# Patient Record
Sex: FEMALE | Race: BLACK OR AFRICAN AMERICAN
[De-identification: names, ages, dates, MRNs, and addresses within clinical notes are randomized per-mention and may not be internally consistent; named-entity substitution may affect disease eponyms.]

---

## 2017-05-25 ENCOUNTER — HOSPITAL ENCOUNTER (EMERGENCY)
Dept: HOSPITAL 74 - JERFT | Age: 77
Discharge: HOME | End: 2017-05-25
Payer: COMMERCIAL

## 2017-05-25 VITALS — DIASTOLIC BLOOD PRESSURE: 80 MMHG | TEMPERATURE: 98.2 F | HEART RATE: 87 BPM | SYSTOLIC BLOOD PRESSURE: 145 MMHG

## 2017-05-25 VITALS — BODY MASS INDEX: 32.9 KG/M2

## 2017-05-25 DIAGNOSIS — L02.511: Primary | ICD-10-CM

## 2017-05-25 DIAGNOSIS — L03.011: ICD-10-CM

## 2017-05-25 DIAGNOSIS — Z79.4: ICD-10-CM

## 2017-05-25 DIAGNOSIS — E11.9: ICD-10-CM

## 2017-05-25 DIAGNOSIS — E78.00: ICD-10-CM

## 2017-05-25 DIAGNOSIS — I10: ICD-10-CM

## 2017-05-25 PROCEDURE — 0H9FXZZ DRAINAGE OF RIGHT HAND SKIN, EXTERNAL APPROACH: ICD-10-PCS

## 2017-05-25 NOTE — PDOC
History of Present Illness





- General


Chief Complaint: Wound Infection


Stated Complaint: Wound


Time Seen by Provider: 05/25/17 14:40


History Source: Patient





- History of Present Illness


Initial Comments: 


05/25/17 14:49


Chief complaint: My thumb hurts





Pt. is a 76 y/o female with a PMH of IDDM who presents with a chief complaint 

of R first finger pain. Pt. states that she thinks "it is infected". Pt states 

that she picked a cuticle approximately one week ago. She states that since then

, the area around the nail has become swollen. She states that she has been 

using Tylenol and antibiotic ointment on the area with some relief. Denies 

fevers, chills, fatigue, numbness, tingling, or weakness in the area.











Past History





- Travel


Traveled outside of the country in the last 30 days: No


Close contact w/someone who was outside of country & ill: No





- Past Medical History


Allergies/Adverse Reactions: 


 Allergies











Allergy/AdvReac Type Severity Reaction Status Date / Time


 


Penicillins Allergy  rash Verified 05/25/17 14:12





   swelling  











Home Medications: 


Ambulatory Orders





Atorvastatin Ca [Lipitor] 10 mg PO DAILY 05/05/14 


Insulin Glargine,Hum.rec.anlog [Lantus (10mL VIAL) -] 100 units PO DAILY 05/05/ 14 


Mag Ox/Calcium/Potassium/E/Adam [Leg-Gesic Multi Minerals Liq] 473 ml PO DAILY 05 /05/14 


Insulin Aspart Prot/Insuln Asp [Novolog Mix 70-30 Vial] 100 unit SQ ASDIR 05/25/ 17 


Lovastatin 20 mg PO ASDIR 05/25/17 


Sodium Bicarbonate 325 mg PO ASDIR 05/25/17 


Sulfamethoxazole/Trimethoprim [Bactrim Ds -] 1 tab PO BID #14 tablet 05/25/17 


Warfarin Na [Coumadin] 5 mg PO ASDIR 05/25/17 








Diabetes: Yes


HTN: Yes


Hypercholesterolemia: Yes





- Psycho/Social/Smoking Cessation Hx


Anxiety: No


Suicidal Ideation: No


Smoking Status: No


Smoking History: Never smoked


Number of Cigarettes Smoked Daily: 0


Information on smoking cessation initiated: No


Hx Alcohol Use: No


Substance Use Type: None





**Review of Systems





- Review of Systems


Able to Perform ROS?: Yes


Is the patient limited English proficient: No


Constitutional: Yes: Malaise.  No: Chills, Fever, Weakness


Integumentary: Yes: Change in Color, Other (Pain of the r 1st finger, abscess)





*Physical Exam





- Vital Signs


 Last Vital Signs











Temp Pulse Resp BP Pulse Ox


 


 98.2 F   87   18   145/80   98 


 


 05/25/17 14:08  05/25/17 14:08  05/25/17 14:08  05/25/17 14:08  05/25/17 14:08














- Physical Exam


General Appearance: Yes: Nourished, Appropriately Dressed.  No: Apparent 

Distress


Extremity: positive: Normal Capillary Refill, Normal Range of Motion, Erythema (

R distal 1st finger), Inflammation (R distal first finer)


Integumentary: positive: Dry, Warm, Erythema (R distal first finger), Other (

Paronychia of the R lateral cuticle of the R 1st finger)





Procedures





- Incision and Drainage


I&D Site: Right: Paronychia (right medial cuticle)


Betadine cleansed: Yes (soaked with saline and betadine solution for 10 minutes)


Blade Size: 18g needle used


Attempts: 1 (abscess drained with good output. Milked area and all pus was 

evacuated)





Medical Decision Making





- Medical Decision Making


05/25/17 15:15


Pt. is a 76 y/o female PMH of IDDM, presenting with a paronychia of the R 1st 

finger. Will drain the abscess at this time. Pt. will soak the finger with a 

betadine/saline solution prior to drainage. Will place pt. on antibiotics post 

procedure d/t IDDM. Obtain wound culture.





05/25/17 16:08


Pt. tolerated procedure well. Soak brought abscess to the top of the skin and 

was easily drained with an 18 gauge needle. All pus appears to be milked out of 

the abscess. Wound culture sent. Bandaid placed over the wound. Pt. instructed 

to keep with warm water soaks at home 2-4 times per day for at least 15 

minutes. Will place pt. on bactrim d/t IDDM history. Will have pt. follow up 

with her PCP. Pt. understands all discharge instructions and all questions were 

answered at this time. 





*DC/Admit/Observation/Transfer


Diagnosis at time of Disposition: 


Paronychia


Qualifiers:


 Laterality: right Qualified Code(s): L03.011 - Cellulitis of right finger





- Discharge Dispostion


Disposition: HOME


Condition at time of disposition: Stable


Admit: No





- Prescriptions


Prescriptions: 


Sulfamethoxazole/Trimethoprim [Bactrim Ds -] 1 tab PO BID #14 tablet





- Referrals


Referrals: 


Randi Britt [Primary Care Provider] - 





- Patient Instructions


Printed Discharge Instructions:  [facility] for Paronychia


Additional Instructions: 


You have an infection near your nail bed called a paronychia. It was drained 

today in the emergency department. Continue with your warm water soaks (15 

minutes 2-4 times a day). You were prescribed antibiotics. Take the antibiotics 

as prescribed and finish the dose even if you feel better. You may take Tylenol 

or motrin as needed for pain.





Return the the ED if the swelling or infection gets worse, if you develop 

fevers or chills, or if you have any changes in your symptoms.

## 2017-09-12 ENCOUNTER — HOSPITAL ENCOUNTER (EMERGENCY)
Dept: HOSPITAL 74 - JER | Age: 77
LOS: 1 days | Discharge: HOME | End: 2017-09-13
Payer: COMMERCIAL

## 2017-09-12 VITALS — BODY MASS INDEX: 33 KG/M2

## 2017-09-12 DIAGNOSIS — R23.8: ICD-10-CM

## 2017-09-12 DIAGNOSIS — Z79.4: ICD-10-CM

## 2017-09-12 DIAGNOSIS — L02.415: Primary | ICD-10-CM

## 2017-09-12 DIAGNOSIS — E78.00: ICD-10-CM

## 2017-09-12 DIAGNOSIS — I10: ICD-10-CM

## 2017-09-12 DIAGNOSIS — E11.9: ICD-10-CM

## 2017-09-12 PROCEDURE — 0J9L0ZZ DRAINAGE OF RIGHT UPPER LEG SUBCUTANEOUS TISSUE AND FASCIA, OPEN APPROACH: ICD-10-PCS

## 2017-09-13 VITALS — TEMPERATURE: 98.4 F

## 2017-09-13 VITALS — HEART RATE: 96 BPM | SYSTOLIC BLOOD PRESSURE: 154 MMHG | DIASTOLIC BLOOD PRESSURE: 95 MMHG

## 2017-09-13 LAB
ALBUMIN SERPL-MCNC: 3.7 G/DL (ref 3.4–5)
ALP SERPL-CCNC: 117 U/L (ref 45–117)
ALT SERPL-CCNC: 22 U/L (ref 12–78)
ANION GAP SERPL CALC-SCNC: 7 MMOL/L (ref 8–16)
AST SERPL-CCNC: 14 U/L (ref 15–37)
BASOPHILS # BLD: 0.8 % (ref 0–2)
BILIRUB SERPL-MCNC: 0.8 MG/DL (ref 0.2–1)
CALCIUM SERPL-MCNC: 8.9 MG/DL (ref 8.5–10.1)
CO2 SERPL-SCNC: 24 MMOL/L (ref 21–32)
CREAT SERPL-MCNC: 1.6 MG/DL (ref 0.55–1.02)
DEPRECATED RDW RBC AUTO: 14 % (ref 11.6–15.6)
EOSINOPHIL # BLD: 1.3 % (ref 0–4.5)
GLUCOSE SERPL-MCNC: 246 MG/DL (ref 74–106)
INR BLD: 1.2 (ref 0.82–1.09)
MCH RBC QN AUTO: 28.8 PG (ref 25.7–33.7)
MCHC RBC AUTO-ENTMCNC: 32.8 G/DL (ref 32–36)
MCV RBC: 87.7 FL (ref 80–96)
NEUTROPHILS # BLD: 64.7 % (ref 42.8–82.8)
PLATELET # BLD AUTO: 203 K/MM3 (ref 134–434)
PMV BLD: 8.4 FL (ref 7.5–11.1)
PROT SERPL-MCNC: 6.8 G/DL (ref 6.4–8.2)
PT PNL PPP: 13.2 SEC (ref 9.98–11.88)
WBC # BLD AUTO: 6.2 K/MM3 (ref 4–10)

## 2017-09-13 NOTE — PDOC
*Physical Exam





- Vital Signs


 Last Vital Signs











Temp Pulse Resp BP Pulse Ox


 


 98.4 F   96 H  14   154/95   97 


 


 09/13/17 00:19  09/13/17 00:17  09/13/17 00:17  09/13/17 00:17  09/13/17 00:17














ED Treatment Course





- LABORATORY


CBC & Chemistry Diagram: 


 09/13/17 01:29





 09/13/17 01:29





Medical Decision Making





- Medical Decision Making





09/13/17 00:47


agree with care from [facility]





*DC/Admit/Observation/Transfer


Diagnosis at time of Disposition: 


 Skin bulla, Cellulitis and abscess of right leg





- Discharge Dispostion


Disposition: HOME





- Prescriptions


Prescriptions: 


Clindamycin [Cleocin -] 300 mg PO TID #30 capsule





- Referrals


Referrals: 


Randi Britt [Primary Care Provider] - 





- Patient Instructions


Printed Discharge Instructions:  [facility] for Wound Infection


Additional Instructions: 


take clindamycin as prescribed. 





follow up with your in 1 day for a wound check;








return to the ED  if you develop fever, worsening redness, pain and streaking 

up the leg.

## 2017-09-13 NOTE — PDOC
History of Present Illness





- General


Chief Complaint: Wound Infection


Stated Complaint: ABSCESS BOIL


Time Seen by Provider: 09/13/17 00:25


History Source: Patient





- History of Present Illness


Initial Comments: 


09/13/17 01:18


77 year old c/o of blister/boil to right thigh with increased pain and redness 

to the area since this morning. patient reports initially small pustule noted. 

now appears to be a bullae with pain. patient has a history of diabetes, DVTs 

on coumadin, hypercholestremia denies fever, NVD, abdominal pain











Past History





- Past Medical History


Allergies/Adverse Reactions: 


 Allergies











Allergy/AdvReac Type Severity Reaction Status Date / Time


 


Penicillins Allergy  rash Verified 09/13/17 00:16





   swelling  











Home Medications: 


Ambulatory Orders





Atorvastatin Ca [Lipitor] 10 mg PO DAILY 05/05/14 


Mag Ox/Calcium/Potassium/E/Adam [Leg-Gesic Multi Minerals Liq] 473 ml PO DAILY 05 /05/14 


Insulin Aspart Prot/Insuln Asp [Novolog Mix 70-30 Vial] 22 unit SQ AM 05/25/17 


Lovastatin 20 mg PO ASDIR 05/25/17 


Sodium Bicarbonate 2 tab PO DAILY 05/25/17 


Warfarin Na [Coumadin] 5 mg PO DAILY 05/25/17 


Clindamycin [Cleocin -] 300 mg PO TID #30 capsule 09/13/17 


Insulin (Novolog 70/30) [Novolog Mix 70/30 Flexpen -] 24 units SQ HS 09/13/17 








Diabetes: Yes


HTN: Yes


Hypercholesterolemia: Yes





- Psycho/Social/Smoking Cessation Hx


Anxiety: No


Suicidal Ideation: No


Smoking Status: No


Smoking History: Never smoked


Number of Cigarettes Smoked Daily: 0


Information on smoking cessation initiated: No


Hx Alcohol Use: No


Drug/Substance Use Hx: No


Substance Use Type: None





**Review of Systems





- Review of Systems


Able to Perform ROS?: Yes


Is the patient limited English proficient: No


Integumentary: Yes: Lesions





*Physical Exam





- Vital Signs


 Last Vital Signs











Temp Pulse Resp BP Pulse Ox


 


 98.4 F   96 H  14   154/95   97 


 


 09/13/17 00:19  09/13/17 00:17  09/13/17 00:17  09/13/17 00:17  09/13/17 00:17














- Physical Exam


General Appearance: Yes: Appropriately Dressed


Extremity: positive: Other (right thigh quarter sized bulla with yellow fluid, 

surrounding erythema / tenderness and warmth. no streaking noted)


Neurologic: positive: Fully Oriented, Alert, Normal Mood/Affect





Procedures





- Incision and Drainage


I&D Site: Right: Leg (right thigh bulla drained ~ 2ml pus/serous drainge)


Betadine cleansed: Yes


Blade Size: 11


Plain Packing: No


Dressing: No





ED Treatment Course





- LABORATORY


CBC & Chemistry Diagram: 


 09/13/17 01:29





 09/13/17 01:29





- RADIOLOGY


Radiograph Interpretation: 





09/13/17 01:57


no DVT : right leg venous u/s





Progress Note





- Progress Note


Progress Note: 


A: 





P:US 





CBC





Blood culture





cmp





*DC/Admit/Observation/Transfer


Diagnosis at time of Disposition: 


 Skin bulla, Cellulitis and abscess of right leg





- Discharge Dispostion


Disposition: HOME





- Prescriptions


Prescriptions: 


Clindamycin [Cleocin -] 300 mg PO TID #30 capsule





- Referrals


Referrals: 


Randi Britt [Primary Care Provider] - 





- Patient Instructions


Printed Discharge Instructions:  [facility] for Wound Infection


Additional Instructions: 


take clindamycin as prescribed. 





follow up with your in 1 day for a wound check;








return to the ED  if you develop fever, worsening redness, pain and streaking 

up the leg.

## 2018-09-21 ENCOUNTER — HOSPITAL ENCOUNTER (EMERGENCY)
Dept: HOSPITAL 74 - JERFT | Age: 78
Discharge: HOME | End: 2018-09-21
Payer: COMMERCIAL

## 2018-09-21 VITALS — SYSTOLIC BLOOD PRESSURE: 141 MMHG | DIASTOLIC BLOOD PRESSURE: 59 MMHG | TEMPERATURE: 97 F | HEART RATE: 83 BPM

## 2018-09-21 VITALS — BODY MASS INDEX: 30.7 KG/M2

## 2018-09-21 DIAGNOSIS — M65.4: Primary | ICD-10-CM

## 2018-09-21 DIAGNOSIS — E78.00: ICD-10-CM

## 2018-09-21 DIAGNOSIS — E11.9: ICD-10-CM

## 2018-09-21 DIAGNOSIS — I10: ICD-10-CM

## 2018-09-21 NOTE — PDOC
History of Present Illness





- General


Chief Complaint: Pain


Stated Complaint: LEFT HAND PAIN


Time Seen by Provider: 09/21/18 15:14


History Source: Patient


Exam Limitations: Clinical Condition





- History of Present Illness


Initial Comments: 





09/21/18 15:32


Patient with no significant past medical history present with complain of 5 

days history of worsening pain to left wrist over base of thumb. Patient denies 

any trauma or injury to hand or wrist. Patient took Tylenol for pain which she 

reported helped a little. Denies any other symptoms


Timing/Duration: other (5 days)





Past History





- Past Medical History


Allergies/Adverse Reactions: 


 Allergies











Allergy/AdvReac Type Severity Reaction Status Date / Time


 


Penicillins Allergy  rash Verified 09/21/18 14:36





   swelling  











Home Medications: 


Ambulatory Orders





Atorvastatin Ca [Lipitor] 10 mg PO DAILY 05/05/14 


Mag Ox/Calcium/Potassium/E/Adam [Leg-Gesic Multi Minerals Liq] 473 ml PO DAILY 05 /05/14 


Insulin Aspart Prot/Insuln Asp [Novolog Mix 70-30 Vial] 22 unit SQ AM 05/25/17 


Lovastatin 20 mg PO ASDIR 05/25/17 


Sodium Bicarbonate 2 tab PO DAILY 05/25/17 


Warfarin Na [Coumadin] 5 mg PO DAILY 05/25/17 


Insulin (Novolog 70/30) [Novolog Mix 70/30 Flexpen -] 24 units SQ HS 09/13/17 


Naproxen 500 mg PO BID PRN #20 tablet 09/21/18 








COPD: No


Diabetes: Yes


HTN: Yes


Hypercholesterolemia: Yes





- Suicide/Smoking/Psychosocial Hx


Smoking Status: No


Smoking History: Never smoked


Number of Cigarettes Smoked Daily: 0


Hx Alcohol Use: No


Drug/Substance Use Hx: No


Substance Use Type: None





**Review of Systems





- Review of Systems


Able to Perform ROS?: Yes


Is the patient limited English proficient: No


Constitutional: No: Weakness (to wrist)


Respiratory: No: Symptoms reported


Cardiac (ROS): No: Symptoms Reported


ABD/GI: No: Symptoms Reported


Musculoskeletal: Yes: See HPI, Joint Pain (left wrist), Muscle Pain (left wrist 

by thumb).  No: Muscle Weakness, Joint Stiffness


All Other Systems: Reviewed and Negative





*Physical Exam





- Vital Signs


 Last Vital Signs











Temp Pulse Resp BP Pulse Ox


 


 97 F L  83   18   141/59 L  98 


 


 09/21/18 14:30  09/21/18 14:30  09/21/18 14:30  09/21/18 14:30  09/21/18 14:30














- Physical Exam


Comments: 





09/21/18 15:34


GENERAL:


Well developed, well nourished. Awake and alert. No acute distress.


CARDIOVASCULAR:


Regular rate and rhythm. No murmurs, rubs, or gallops. 


PULMONARY: 


No evidence of respiratory distress. Lungs clear to auscultation bilaterally. 

No wheezing, rales or rhonchi.


ABDOMINAL:


Soft. Non-tender. Non-distended. No rebound or guarding. No organomegaly. 

Normoactive bowel sounds


MUSCULOSKELETAL : Moderate tenderness to base of left thumb radial side of left 

wrist. Positive for finkelstein's test. No bony deformities 


EXTREMITIES: 


No cyanosis. No clubbing. No edema. No calf tenderness.


SKIN: 


Warm and dry. Normal capillary refill. No rashes. No jaundice. 


NEUROLOGICAL: 


Alert, awake, appropriate.  No motor deficits in the  lower extremities.  Gait 

is normal without ataxia.


PSYCHIATRIC: 


Cooperative. Good eye contact. Appropriate mood and affect.


General Appearance: Yes: Nourished, Appropriately Dressed.  No: Apparent 

Distress





ED Treatment Course





- RADIOLOGY


Radiology Studies Ordered: 














 Category Date Time Status


 


 WRIST W/HAND-LEFT* [RAD] Stat Radiology  09/21/18 15:27 Ordered














Medical Decision Making





- Medical Decision Making





09/21/18 15:37


Patient with no significant past medical history presenting with complain of 

persistent left wrist pain over thumb area. Exam significant for moderate 

tenderness over the base of left thumb with positive Finkelstein test 

consistent with De quervains' syndrome. X-ray of left wrist and hand ordered. 

Patient be treated with NSAIDs and thumb brace if negative x-ray with 

orthopedics follow-up


09/21/18 15:46


x-rays shows no acute fracture or dislocation except arthritis changes in 

wrist. patient stable for home discharge with ortho follow-up





*DC/Admit/Observation/Transfer


Diagnosis at time of Disposition: 


 Radial styloid tenosynovitis [de quervain]








- Discharge Dispostion


Disposition: HOME


Condition at time of disposition: Stable


Decision to Admit order: No





- Prescriptions


Prescriptions: 


Naproxen 500 mg PO BID PRN #20 tablet


 PRN Reason: wrist pain





- Referrals


Referrals: 


Randi Britt [Primary Care Provider] - 


Gilberto Aldrich MD [Staff Physician] - 





- Patient Instructions


Printed Discharge Instructions:  [facility] for Tenosynovitis


Additional Instructions: 


Your x-ray was negative for any fracture or dislocation. Keep wrist brace on 

and take medication as prescribed for pain until symptoms resolve. Follow up 

with preferred orthopedics if symptoms persist for more than 5 days





- Post Discharge Activity

## 2019-02-26 ENCOUNTER — HOSPITAL ENCOUNTER (EMERGENCY)
Dept: HOSPITAL 74 - JERFT | Age: 79
Discharge: HOME | End: 2019-02-26
Payer: COMMERCIAL

## 2019-02-26 VITALS — SYSTOLIC BLOOD PRESSURE: 157 MMHG | TEMPERATURE: 97.7 F | HEART RATE: 68 BPM | DIASTOLIC BLOOD PRESSURE: 101 MMHG

## 2019-02-26 VITALS — BODY MASS INDEX: 30.7 KG/M2

## 2019-02-26 DIAGNOSIS — L03.011: Primary | ICD-10-CM

## 2019-02-26 NOTE — PDOC
Rapid Medical Evaluation


Medical Evaluation: 


 Allergies











Allergy/AdvReac Type Severity Reaction Status Date / Time


 


Penicillins Allergy  rash Verified 09/21/18 14:36





   swelling  








I have performed a brief in-person evaluation of this patient.


The patient presents with a chief complaint of: R thumb pain and swelling x 2 

days; denies trauma


Pertinent physical exam findings: Mild swelling of R thumb, +TTP along medial 

aspect of R thumb, no erythema, no fluctuance, able to flex at IP joint


I have ordered the following: Nothing


The patient will proceed to the ED for further evaluation.


 








02/26/19 15:35

## 2019-02-26 NOTE — PDOC
History of Present Illness





- General


Chief Complaint: Bone Injury


Stated Complaint: RT THUMB SWOLLEN


Time Seen by Provider: 02/26/19 15:36





- History of Present Illness


Initial Comments: 





02/26/19 16:00


79-year-old female presents for evaluation of atraumatic right thumb pain and 

swelling 3 days without systemic symptoms. Past medical history significant 

for diabetes and hypertension.





Past History





- Past Medical History


Allergies/Adverse Reactions: 


 Allergies











Allergy/AdvReac Type Severity Reaction Status Date / Time


 


Penicillins Allergy  rash Verified 09/21/18 14:36





   swelling  











Home Medications: 


Ambulatory Orders





Atorvastatin Ca [Lipitor] 10 mg PO DAILY 05/05/14 


Mag Ox/Calcium/Potassium/E/Adam [Leg-Gesic Multi Minerals Liq] 473 ml PO DAILY 05 /05/14 


Insulin Aspart Prot/Insuln Asp [Novolog Mix 70-30 Vial] 22 unit SQ AM 05/25/17 


Lovastatin 20 mg PO ASDIR 05/25/17 


Warfarin Na [Coumadin] 5 mg PO DAILY 05/25/17 


Insulin (Novolog 70/30) [Novolog Mix 70/30 Flexpen -] 24 units SQ HS 09/13/17 


Clindamycin [Cleocin -] 300 mg PO TID #21 capsule 02/26/19 








COPD: No


Diabetes: Yes


HTN: Yes


Hypercholesterolemia: Yes





- Immunization History


Immunization Up to Date: No





- Suicide/Smoking/Psychosocial Hx


Smoking Status: No


Smoking History: Never smoked


Have you smoked in the past 12 months: No


Number of Cigarettes Smoked Daily: 0


Information on smoking cessation initiated: No


Hx Alcohol Use: No


Drug/Substance Use Hx: No


Substance Use Type: None





**Review of Systems





- Review of Systems


Constitutional: No: Chills, Fever, Malaise, Night Sweats


Musculoskeletal: Yes: See HPI





*Physical Exam





- Vital Signs


 Last Vital Signs











Temp Pulse Resp BP Pulse Ox


 


 97.7 F   68   16   157/101 H  98 


 


 02/26/19 15:37  02/26/19 15:37  02/26/19 15:37  02/26/19 15:37  02/26/19 15:37














- Physical Exam


Comments: 





02/26/19 16:01


There is mild erythema and warmth at the ulnar aspect of the right thumb 

adjacent to the ulnar sided nail fold. There is no focal fluctuance there is 

mild sensitivity and tenderness. There is no induration. No gross sensorimotor 

deficits. No tenderness at the flexor surface of the thumb no pain with passive 

stretch neurovascularly intact.





Moderate Sedation





- Procedure Monitoring


Vital Signs: 


Procedure Monitoring Vital Signs











Temperature  97.7 F   02/26/19 15:37


 


Pulse Rate  68   02/26/19 15:37


 


Respiratory Rate  16   02/26/19 15:37


 


Blood Pressure  157/101 H  02/26/19 15:37


 


O2 Sat by Pulse Oximetry (%)  98   02/26/19 15:37











Medical Decision Making





- Medical Decision Making





02/26/19 16:02


I suspect forming paronychia I will start her on antibiotics and have her follow

-up with hand surgery





*DC/Admit/Observation/Transfer


Diagnosis at time of Disposition: 


 Paronychia








- Discharge Dispostion


Disposition: HOME


Condition at time of disposition: Stable


Decision to Admit order: No





- Prescriptions


Prescriptions: 


Clindamycin [Cleocin -] 300 mg PO TID #21 capsule





- Referrals


Referrals: 


Randi Britt [Primary Care Provider] - 


Tin Juarez MD [Staff Physician] - 





- Patient Instructions


Printed Discharge Instructions:  [facility] for Paronychia


Additional Instructions: 


Please take the antibiotics as prescribed. Follow-up with hand surgery in 1-2 

days for further evaluation and treatment options. Warm compresses 5-6 times a 

day as we discussed. Tylenol for pain as directed. Return to the emergency room 

for worsening symptoms.





- Post Discharge Activity

## 2019-03-03 ENCOUNTER — HOSPITAL ENCOUNTER (EMERGENCY)
Dept: HOSPITAL 74 - JERFT | Age: 79
Discharge: HOME | End: 2019-03-03
Payer: COMMERCIAL

## 2019-03-03 VITALS — BODY MASS INDEX: 31.9 KG/M2

## 2019-03-03 VITALS — DIASTOLIC BLOOD PRESSURE: 61 MMHG | SYSTOLIC BLOOD PRESSURE: 156 MMHG | HEART RATE: 76 BPM | TEMPERATURE: 97.9 F

## 2019-03-03 DIAGNOSIS — I25.10: ICD-10-CM

## 2019-03-03 DIAGNOSIS — L03.012: Primary | ICD-10-CM

## 2019-03-03 DIAGNOSIS — Z79.4: ICD-10-CM

## 2019-03-03 DIAGNOSIS — I10: ICD-10-CM

## 2019-03-03 DIAGNOSIS — E11.9: ICD-10-CM

## 2019-03-03 PROCEDURE — 0J9K0ZZ DRAINAGE OF LEFT HAND SUBCUTANEOUS TISSUE AND FASCIA, OPEN APPROACH: ICD-10-PCS

## 2019-03-03 NOTE — PDOC
History of Present Illness





- General


Chief Complaint: Pain


Stated Complaint: LT THUMB PAIN


Time Seen by Provider: 03/03/19 11:20





- History of Present Illness


Initial Comments: 





03/03/19 11:54


79-year-old female without significant past medical history of insulin-

dependent diabetes hypertension and coronary artery disease presents for 

evaluation of right thumb pain 2 days. She was seen in the emergency room 

placed on clindamycin for paronychia which could be open at that time and she 

returns today for further evaluation and treatment options. She has no systemic 

symptoms.





Past History





- Past Medical History


Allergies/Adverse Reactions: 


 Allergies











Allergy/AdvReac Type Severity Reaction Status Date / Time


 


Penicillins Allergy  rash Verified 03/03/19 10:48





   swelling  











Home Medications: 


Ambulatory Orders





Atorvastatin Ca [Lipitor] 10 mg PO DAILY 05/05/14 


Mag Ox/Calcium/Potassium/E/Adam [Leg-Gesic Multi Minerals Liq] 473 ml PO DAILY 05 /05/14 


Insulin Aspart Prot/Insuln Asp [Novolog Mix 70-30 Vial] 22 unit SQ AM 05/25/17 


Lovastatin 20 mg PO ASDIR 05/25/17 


Warfarin Na [Coumadin] 5 mg PO DAILY 05/25/17 


Insulin (Novolog 70/30) [Novolog Mix 70/30 Flexpen -] 24 units SQ HS 09/13/17 


Clindamycin [Cleocin -] 300 mg PO TID #21 capsule 02/26/19 








COPD: No


Diabetes: Yes


HTN: Yes


Hypercholesterolemia: Yes





- Immunization History


Immunization Up to Date: No





- Suicide/Smoking/Psychosocial Hx


Smoking Status: No


Smoking History: Never smoked


Have you smoked in the past 12 months: No


Number of Cigarettes Smoked Daily: 0


Hx Alcohol Use: No


Drug/Substance Use Hx: No


Substance Use Type: None





**Review of Systems





- Review of Systems


Constitutional: No: Fever


Musculoskeletal: Yes: See HPI





*Physical Exam





- Vital Signs


 Last Vital Signs











Temp Pulse Resp BP Pulse Ox


 


 97.9 F   76   18   156/61   98 


 


 03/03/19 10:46  03/03/19 10:46  03/03/19 10:46  03/03/19 10:46  03/03/19 10:46














- Physical Exam


Comments: 





03/03/19 11:54


Right thumb skin color and temperature are normal. There is fluctuance erythema 

warmth tenderness sensitivity no induration on the radial aspect of the right 

thumb adjacent to the nail fold. There appears to be purulence on the skin. No 

gross sensorimotor deficits. She is neurovascularly intact.





Moderate Sedation





- Procedure Monitoring


Vital Signs: 


Procedure Monitoring Vital Signs











Temperature  97.9 F   03/03/19 10:46


 


Pulse Rate  76   03/03/19 10:46


 


Respiratory Rate  18   03/03/19 10:46


 


Blood Pressure  156/61   03/03/19 10:46


 


O2 Sat by Pulse Oximetry (%)  98   03/03/19 10:46











Medical Decision Making





- Medical Decision Making





03/03/19 11:55


Under aseptic technique a digital block was done to the right thumb. 6 mL of 1% 

lidocaine without epinephrine was used. After appropriate anesthesia an 11 

blade was used to incise the parent note. An elliptical fashion. About 3 mL of 

purulent material was drained. The area was deloculated and explored. Packed 

with quarter-inch iodoform a dry sterile dressing was placed. tolerated well





*DC/Admit/Observation/Transfer


Diagnosis at time of Disposition: 


 Paronychia








- Discharge Dispostion


Disposition: HOME


Condition at time of disposition: Stable


Decision to Admit order: No





- Referrals


Referrals: 


Randi Britt [Primary Care Provider] - 


Tin Juarez MD [Staff Physician] - 





- Patient Instructions


Printed Discharge Instructions:  [facility] for Paronychia


Additional Instructions: 


Please continue the clindamycin as well as her regular medications as 

scheduled. Return to the emergency room in 2 days for wound check and packing 

removal. Do not get the dressing wet. He may take Tylenol for pain as directed. 

Return to the emergency room for worsening symptoms. Follow-up with hand 

surgery in 2-3 days.





- Post Discharge Activity

## 2019-03-05 ENCOUNTER — HOSPITAL ENCOUNTER (EMERGENCY)
Dept: HOSPITAL 74 - JERFT | Age: 79
Discharge: HOME | End: 2019-03-05
Payer: COMMERCIAL

## 2019-03-05 VITALS — TEMPERATURE: 98.4 F | SYSTOLIC BLOOD PRESSURE: 161 MMHG | HEART RATE: 82 BPM | DIASTOLIC BLOOD PRESSURE: 77 MMHG

## 2019-03-05 VITALS — BODY MASS INDEX: 31.9 KG/M2

## 2019-03-05 DIAGNOSIS — Z48.817: Primary | ICD-10-CM

## 2019-03-05 DIAGNOSIS — L03.011: ICD-10-CM

## 2019-03-05 NOTE — PDOC
History of Present Illness





- General


Chief Complaint: Revisit,Wound Recheck


Stated Complaint: WOUND FOLLOW UP


Time Seen by Provider: 03/05/19 12:41





Past History





- Past Medical History


Allergies/Adverse Reactions: 


 Allergies











Allergy/AdvReac Type Severity Reaction Status Date / Time


 


Penicillins Allergy  rash Verified 03/05/19 12:36





   swelling  











Home Medications: 


Ambulatory Orders





Atorvastatin Ca [Lipitor] 10 mg PO DAILY 05/05/14 


Mag Ox/Calcium/Potassium/E/Adam [Leg-Gesic Multi Minerals Liq] 473 ml PO DAILY 05 /05/14 


Insulin Aspart Prot/Insuln Asp [Novolog Mix 70-30 Vial] 22 unit SQ AM 05/25/17 


Lovastatin 20 mg PO ASDIR 05/25/17 


Warfarin Na [Coumadin] 5 mg PO DAILY 05/25/17 


Insulin (Novolog 70/30) [Novolog Mix 70/30 Flexpen -] 24 units SQ HS 09/13/17 


Clindamycin [Cleocin -] 300 mg PO TID #21 capsule 02/26/19 








COPD: No


Diabetes: Yes


HTN: Yes


Hypercholesterolemia: Yes





- Immunization History


Immunization Up to Date: No





- Suicide/Smoking/Psychosocial Hx


Smoking Status: No


Smoking History: Never smoked


Have you smoked in the past 12 months: No


Number of Cigarettes Smoked Daily: 0


Hx Alcohol Use: No


Drug/Substance Use Hx: No


Substance Use Type: None





**Review of Systems





- Review of Systems


Constitutional: Yes: See HPI





*Physical Exam





- Vital Signs


 Last Vital Signs











Temp Pulse Resp BP Pulse Ox


 


 98.4 F   82   18   161/77   99 


 


 03/05/19 12:38  03/05/19 12:38  03/05/19 12:38  03/05/19 12:38  03/05/19 12:38














- Physical Exam


Comments: 





03/05/19 13:11


Dressing was removed from right thumb. She remove the packing this morning when 

she changed her dressing. The wound is granulating nicely. There is no erythema 

fluctuance tenderness is appropriate no gross sensorimotor deficits.





Moderate Sedation





- Procedure Monitoring


Vital Signs: 


Procedure Monitoring Vital Signs











Temperature  98.4 F   03/05/19 12:38


 


Pulse Rate  82   03/05/19 12:38


 


Respiratory Rate  18   03/05/19 12:38


 


Blood Pressure  161/77   03/05/19 12:38


 


O2 Sat by Pulse Oximetry (%)  99   03/05/19 12:38











Medical Decision Making





- Medical Decision Making





03/05/19 13:12


I placed the patient in a wet-to-dry dressing. Using 2 x 2's and sterile 

saline. I instructed to do this twice a day for the next 3 days and follow-up 

with hand surgery. Return to the emergency room for further issues should she 

require.





*DC/Admit/Observation/Transfer


Diagnosis at time of Disposition: 


 Paronychia








- Discharge Dispostion


Disposition: HOME


Condition at time of disposition: Improved


Decision to Admit order: No





- Referrals


Referrals: 


Tni Juarez MD [Staff Physician] - 





- Patient Instructions


Additional Instructions: 


As we discussed please do the wet-to-dry dressing changes twice daily for the 

next 3 days. At night he may remove the dressing and wash the area with soap 

and water and leave it open to air putting a new dressing on in the morning. 

Return to the emergency room for further issues. Follow-up with hand surgery 

within the next 1-2 days for further evaluation and treatment options. After 3 

days if you have not followed up with hand surgery he may do do wet-to-dry 

dressing changes once a day for another 3 days and after that 3 days he may 

just wash it with soap and water and leave it open to air providing the wound 

is healing.





- Post Discharge Activity

## 2023-05-01 ENCOUNTER — OFFICE (OUTPATIENT)
Dept: URBAN - METROPOLITAN AREA CLINIC 30 | Facility: CLINIC | Age: 83
Setting detail: OPHTHALMOLOGY
End: 2023-05-01
Payer: MEDICARE

## 2023-05-01 DIAGNOSIS — E11.9: ICD-10-CM

## 2023-05-01 DIAGNOSIS — H47.233: ICD-10-CM

## 2023-05-01 DIAGNOSIS — H25.12: ICD-10-CM

## 2023-05-01 DIAGNOSIS — Z96.1: ICD-10-CM

## 2023-05-01 DIAGNOSIS — H35.013: ICD-10-CM

## 2023-05-01 PROCEDURE — 92134 CPTRZ OPH DX IMG PST SGM RTA: CPT | Performed by: OPHTHALMOLOGY

## 2023-05-01 PROCEDURE — 92004 COMPRE OPH EXAM NEW PT 1/>: CPT | Performed by: OPHTHALMOLOGY

## 2023-05-01 PROCEDURE — 92020 GONIOSCOPY: CPT | Performed by: OPHTHALMOLOGY

## 2023-05-01 PROCEDURE — 92136 OPHTHALMIC BIOMETRY: CPT | Performed by: OPHTHALMOLOGY

## 2023-05-01 PROCEDURE — 92025 CPTRIZED CORNEAL TOPOGRAPHY: CPT | Performed by: OPHTHALMOLOGY

## 2023-05-01 ASSESSMENT — REFRACTION_CURRENTRX
OS_SPHERE: +2.75
OD_VPRISM_DIRECTION: SV
OS_CYLINDER: SPH
OD_CYLINDER: SPH
OD_OVR_VA: 20/
OS_OVR_VA: 20/
OS_VPRISM_DIRECTION: SV
OD_SPHERE: +2.75

## 2023-05-01 ASSESSMENT — VISUAL ACUITY
OD_BCVA: 20/80-1
OS_BCVA: 20/20-1

## 2023-05-01 ASSESSMENT — REFRACTION_MANIFEST
OD_SPHERE: -0.25
OD_VA1: 20/20
OS_VA1: 20/80
OD_CYLINDER: +0.25
OD_AXIS: 40

## 2023-05-01 ASSESSMENT — CONFRONTATIONAL VISUAL FIELD TEST (CVF)
OD_FINDINGS: FULL
OS_FINDINGS: FULL

## 2023-05-01 ASSESSMENT — SPHEQUIV_DERIVED
OD_SPHEQUIV: -0.125
OD_SPHEQUIV: -0.125

## 2023-05-01 ASSESSMENT — REFRACTION_AUTOREFRACTION
OS_SPHERE: UNABLE
OD_AXIS: 40
OD_SPHERE: -0.25
OD_CYLINDER: +0.25

## 2023-05-01 ASSESSMENT — TONOMETRY
OS_IOP_MMHG: 21
OD_IOP_MMHG: 15

## 2024-10-25 ENCOUNTER — OFFICE (OUTPATIENT)
Facility: LOCATION | Age: 84
Setting detail: OPHTHALMOLOGY
End: 2024-10-25
Payer: MEDICARE

## 2024-10-25 DIAGNOSIS — E11.9: ICD-10-CM

## 2024-10-25 DIAGNOSIS — H35.013: ICD-10-CM

## 2024-10-25 DIAGNOSIS — H25.12: ICD-10-CM

## 2024-10-25 DIAGNOSIS — H25.13: ICD-10-CM

## 2024-10-25 DIAGNOSIS — H47.233: ICD-10-CM

## 2024-10-25 PROCEDURE — 92136 OPHTHALMIC BIOMETRY: CPT | Mod: TC | Performed by: OPHTHALMOLOGY

## 2024-10-25 PROCEDURE — 92014 COMPRE OPH EXAM EST PT 1/>: CPT | Performed by: OPHTHALMOLOGY

## 2024-10-25 PROCEDURE — 92025 CPTRIZED CORNEAL TOPOGRAPHY: CPT | Performed by: OPHTHALMOLOGY

## 2024-10-25 PROCEDURE — 92134 CPTRZ OPH DX IMG PST SGM RTA: CPT | Performed by: OPHTHALMOLOGY

## 2024-10-25 PROCEDURE — 92136 OPHTHALMIC BIOMETRY: CPT | Mod: 26,LT | Performed by: OPHTHALMOLOGY

## 2024-10-25 ASSESSMENT — TONOMETRY
OS_IOP_MMHG: 16
OD_IOP_MMHG: 14

## 2024-10-25 ASSESSMENT — REFRACTION_AUTOREFRACTION
OD_SPHERE: -0.50
OS_SPHERE: ERROR
OD_CYLINDER: +0.50
OD_AXIS: 045

## 2024-10-25 ASSESSMENT — CONFRONTATIONAL VISUAL FIELD TEST (CVF)
OS_FINDINGS: FULL
OD_FINDINGS: FULL

## 2024-10-25 ASSESSMENT — REFRACTION_MANIFEST
OD_SPHERE: -0.25
OD_VA1: 20/20
OD_AXIS: 40
OD_CYLINDER: +0.25
OS_VA1: 20/80

## 2024-10-25 ASSESSMENT — REFRACTION_CURRENTRX
OD_VPRISM_DIRECTION: SV
OS_CYLINDER: SPH
OD_CYLINDER: SPH
OS_VPRISM_DIRECTION: SV
OD_SPHERE: +2.75
OS_OVR_VA: 20/
OS_SPHERE: +2.75
OD_OVR_VA: 20/

## 2024-10-25 ASSESSMENT — VISUAL ACUITY
OS_BCVA: 20/20
OD_BCVA: 20/80-2